# Patient Record
Sex: FEMALE
[De-identification: names, ages, dates, MRNs, and addresses within clinical notes are randomized per-mention and may not be internally consistent; named-entity substitution may affect disease eponyms.]

---

## 2021-01-05 ENCOUNTER — HOSPITAL ENCOUNTER (OUTPATIENT)
Dept: HOSPITAL 62 - RAD | Age: 41
End: 2021-01-05
Attending: INTERNAL MEDICINE
Payer: OTHER GOVERNMENT

## 2021-01-05 DIAGNOSIS — E04.2: Primary | ICD-10-CM

## 2021-01-05 PROCEDURE — 78014 THYROID IMAGING W/BLOOD FLOW: CPT

## 2021-01-05 PROCEDURE — A9516 IODINE I-123 SOD IODIDE MIC: HCPCS

## 2021-01-06 NOTE — RADIOLOGY REPORT (SQ)
EXAM DESCRIPTION:  NM THYROID SCAN AND UPTAKE



IMAGES COMPLETED DATE/TIME:  1/6/2021 9:06 am



REASON FOR STUDY:  (E04.2)NONTOXIC MULTINODULAR GOITER E04.2  NONTOXIC MULTINODULAR GOITER



COMPARISON:  None.



RADIONUCLIDE AND DOSE:  309 microcuries [id number]

The route of agent administration: Oral



ADDITIONAL DRUGS AND DOSES:  None.



TECHNIQUE:  Iodine uptake was measured at 4 and 24 hours.  Images of the neck were acquired.



LIMITATIONS:  None.



FINDINGS:  4 HOUR UPTAKE RADIO-IODINE: 3.9%.

Normal Range of 5-20% [facility]

Normal Range of 5-15% [facility]

Normal Range of 5-15% [facility]

24 HOUR UPTAKE RADIO-IODINE: 5.6%.

Normal Range of 7-35% [facility]

Normal Range of 8-35% [facility]

Normal Range of 15-30% [facility]

SCAN: Homogeneous uptake of the radionuclide throughout both lobes of the gland and isthmus without a
reas of increased or decreased activity.  Normal size.

OTHER: No other significant finding.



IMPRESSION:  Diminished thyroid uptake.  No hot or cold nodules.



TECHNICAL DOCUMENTATION:  JOB ID:  [id number]

 [employer]- All Rights Reserved



Reading location - IP/workstation name: [id number]